# Patient Record
Sex: FEMALE | Race: WHITE
[De-identification: names, ages, dates, MRNs, and addresses within clinical notes are randomized per-mention and may not be internally consistent; named-entity substitution may affect disease eponyms.]

---

## 2019-04-14 ENCOUNTER — HOSPITAL ENCOUNTER (EMERGENCY)
Age: 24
Discharge: HOME | End: 2019-04-14
Payer: COMMERCIAL

## 2019-04-14 VITALS
SYSTOLIC BLOOD PRESSURE: 133 MMHG | HEART RATE: 97 BPM | DIASTOLIC BLOOD PRESSURE: 76 MMHG | OXYGEN SATURATION: 100 % | TEMPERATURE: 98.24 F | RESPIRATION RATE: 16 BRPM

## 2019-04-14 VITALS — BODY MASS INDEX: 20.3 KG/M2

## 2019-04-14 DIAGNOSIS — M99.02: Primary | ICD-10-CM

## 2019-04-14 PROCEDURE — 99283 EMERGENCY DEPT VISIT LOW MDM: CPT

## 2023-10-11 PROBLEM — H66.90 ACUTE OTITIS MEDIA: Status: ACTIVE | Noted: 2023-10-11

## 2023-10-11 PROBLEM — M25.562 LEFT KNEE PAIN: Status: ACTIVE | Noted: 2023-10-11

## 2023-10-11 PROBLEM — K04.7 DENTAL INFECTION: Status: ACTIVE | Noted: 2023-10-11

## 2023-10-11 PROBLEM — S93.401A SPRAIN OF RIGHT ANKLE: Status: ACTIVE | Noted: 2023-10-11

## 2023-10-11 PROBLEM — S99.911A ANKLE INJURY, RIGHT, INITIAL ENCOUNTER: Status: ACTIVE | Noted: 2023-10-11

## 2023-10-11 RX ORDER — IBUPROFEN 600 MG/1
TABLET ORAL 4 TIMES DAILY
COMMUNITY
Start: 2022-08-01 | End: 2023-10-16 | Stop reason: ENTERED-IN-ERROR

## 2023-10-11 RX ORDER — BENZOCAINE 200 MG/G
GEL DENTAL; ORAL; PERIODONTAL
COMMUNITY
Start: 2022-02-04

## 2023-10-11 RX ORDER — FAMOTIDINE 20 MG/1
TABLET, FILM COATED ORAL
COMMUNITY
End: 2023-10-16 | Stop reason: SDUPTHER

## 2023-10-11 RX ORDER — NAPROXEN 500 MG/1
TABLET ORAL 2 TIMES DAILY
COMMUNITY
Start: 2022-08-05

## 2023-10-11 RX ORDER — AMOXICILLIN 875 MG/1
TABLET, FILM COATED ORAL
COMMUNITY
Start: 2022-02-04 | End: 2023-10-16 | Stop reason: SDUPTHER

## 2023-10-11 RX ORDER — AMOXICILLIN 500 MG/1
TABLET, FILM COATED ORAL
COMMUNITY
Start: 2022-08-01 | End: 2023-10-16 | Stop reason: WASHOUT

## 2023-10-16 ENCOUNTER — OFFICE VISIT (OUTPATIENT)
Dept: OBSTETRICS AND GYNECOLOGY | Facility: CLINIC | Age: 28
End: 2023-10-16
Payer: COMMERCIAL

## 2023-10-16 VITALS
HEIGHT: 65 IN | SYSTOLIC BLOOD PRESSURE: 120 MMHG | WEIGHT: 141.2 LBS | DIASTOLIC BLOOD PRESSURE: 86 MMHG | BODY MASS INDEX: 23.53 KG/M2

## 2023-10-16 DIAGNOSIS — R87.810 ASCUS WITH POSITIVE HIGH RISK HPV CERVICAL: Primary | ICD-10-CM

## 2023-10-16 DIAGNOSIS — R87.610 ASCUS WITH POSITIVE HIGH RISK HPV CERVICAL: Primary | ICD-10-CM

## 2023-10-16 PROCEDURE — 88305 TISSUE EXAM BY PATHOLOGIST: CPT

## 2023-10-16 PROCEDURE — 57455 BIOPSY OF CERVIX W/SCOPE: CPT | Performed by: OBSTETRICS & GYNECOLOGY

## 2023-10-16 PROCEDURE — 88141 CYTOPATH C/V INTERPRET: CPT | Performed by: PATHOLOGY

## 2023-10-16 PROCEDURE — 99213 OFFICE O/P EST LOW 20 MIN: CPT | Performed by: OBSTETRICS & GYNECOLOGY

## 2023-10-16 PROCEDURE — 88175 CYTOPATH C/V AUTO FLUID REDO: CPT

## 2023-10-16 RX ORDER — LORATADINE 10 MG/1
10 TABLET ORAL DAILY
COMMUNITY
Start: 2023-09-21

## 2023-10-16 RX ORDER — OMEPRAZOLE 40 MG/1
40 CAPSULE, DELAYED RELEASE ORAL EVERY MORNING
COMMUNITY
Start: 2023-10-09

## 2023-10-16 RX ORDER — NORELGESTROMIN AND ETHINYL ESTRADIOL 150; 35 UG/D; UG/D
PATCH TRANSDERMAL
COMMUNITY
Start: 2023-10-06

## 2023-10-16 RX ORDER — ONDANSETRON 4 MG/1
TABLET, ORALLY DISINTEGRATING ORAL
COMMUNITY
Start: 2023-09-13

## 2023-10-16 NOTE — PROGRESS NOTES
Subjective   Patient ID: Vianey Gallardo is a 28 y.o. female who presents for Colposcopy.  HPI 28-year-old G0 on Xulane patch with recent Pap smear suggested ASCUS with cannot rule out high-grade with positive HPV high risk here for colposcopy.  She reports history of prior abnormal Pap smear and other episode of colposcopy in the past.  No prior treatment.    Review of Systems   Genitourinary:  Negative for dyspareunia, pelvic pain and vaginal bleeding.   Patient ID: Vianey Gallardo is a 28 y.o. female.    Colposcopy    Date/Time: 10/16/2023 3:30 PM    Performed by: Priyank Akhtar MD  Authorized by: Priyank Akhtar MD    Procedure location: cervix    Consent:     Consent obtained:  Verbal    Consent given by:  Patient  Indication:     Cervical indication(s): high-risk HPV positive    Pre-procedure:     Prep solution(s): acetic acid    Procedure:     Colposcopy with: colposcopy only and cervical biopsy      Cervix visibility: fully visualized      SCJ visibility: fully visualized      Lesion visualized: fully visualized      Acetowhite lesion(s): cervix      Acetowhite lesion(s) description:  Globally in TZ zone, Mosiac at 6 O'clock    Vascular changes: cervix      Vascular lesion description:  At 6 o'clock as above    Cervical impression: low grade    Post-procedure:     Estimated blood loss (mL):  1    Instructions and paperwork completed: yes    Comments:      [place] biopsy results possible LEEP if high grade      Objective   Physical Exam    Assessment/Plan   Problem List Items Addressed This Visit             ICD-10-CM    ASCUS with positive high risk HPV cervical - Primary R87.610, R87.810   Colposcopy with biopsy done we will await the results for now I recommended repeat Pap smear in 3 months.

## 2023-10-28 LAB
LABORATORY COMMENT REPORT: NORMAL
PATH REPORT.FINAL DX SPEC: NORMAL
PATH REPORT.GROSS SPEC: NORMAL
PATH REPORT.RELEVANT HX SPEC: NORMAL
PATH REPORT.TOTAL CANCER: NORMAL

## 2023-10-31 LAB
CYTOLOGY CMNT CVX/VAG CYTO-IMP: NORMAL
LAB AP HPV GENOTYPE QUESTION: YES
LAB AP HPV HR: NORMAL
LABORATORY COMMENT REPORT: NORMAL
LMP START DATE: NORMAL
PATH REPORT.TOTAL CANCER: NORMAL

## 2024-12-09 ENCOUNTER — APPOINTMENT (OUTPATIENT)
Dept: OBSTETRICS AND GYNECOLOGY | Facility: CLINIC | Age: 29
End: 2024-12-09
Payer: COMMERCIAL